# Patient Record
Sex: MALE | Race: BLACK OR AFRICAN AMERICAN | NOT HISPANIC OR LATINO | URBAN - METROPOLITAN AREA
[De-identification: names, ages, dates, MRNs, and addresses within clinical notes are randomized per-mention and may not be internally consistent; named-entity substitution may affect disease eponyms.]

---

## 2020-07-16 ENCOUNTER — EMERGENCY (EMERGENCY)
Facility: HOSPITAL | Age: 25
LOS: 1 days | Discharge: HOME | End: 2020-07-16
Attending: EMERGENCY MEDICINE | Admitting: EMERGENCY MEDICINE
Payer: COMMERCIAL

## 2020-07-16 VITALS
OXYGEN SATURATION: 100 % | SYSTOLIC BLOOD PRESSURE: 129 MMHG | HEART RATE: 60 BPM | RESPIRATION RATE: 16 BRPM | TEMPERATURE: 98 F | DIASTOLIC BLOOD PRESSURE: 68 MMHG

## 2020-07-16 PROCEDURE — 99283 EMERGENCY DEPT VISIT LOW MDM: CPT

## 2020-07-16 NOTE — ED PROVIDER NOTE - ATTENDING CONTRIBUTION TO CARE
I performed a face to face evaluation of this patient and obtained a history and performed a full exam.  I agree with the history, physical exam and plan of the PA.    Brief HPI:  24 y/o male with no significant PMHx presents to the ER requesting COVID testing.     Vitals:   Reviewed    Exam:    GEN:  Non-toxic appearing, non-distressed, speaking full sentences, non-diaphoretic, AAOx3  HEENT:  NCAT, neck supple, EOMI, PERRLA, sclera anicteric, no conjunctival pallor or injection, no stridor, normal voice, no tonsillar exudate, uvula midline, tympanic membranes and external auditory canals normal appearing bilaterally   CV:  regular rhythm and rate, s1/s2 audible, no murmurs, rubs or gallops, peripheral pulses 2+ and symmetric  PULM:  non-labored respirations, lungs clear to auscultation bilaterally, no wheezes, crackles or rales  ABD:  non distended, non-tender, no rebound, no guarding, negative Mohr's sign, bowel sounds normal, no cvat  MSK:  no gross deformity, non-tender extremities and joints, range of motion grossly normal appearing, no extremity edema, extremities warm and well perfused   NEURO:  AAOx3, CN II-XII intact, motor 5/5 in upper and lower extremities bilaterally, sensation grossly intact in extremities and trunk, finger to nose testing wnl, no nystagmus, negative Romberg, no pronator drift, no gait deficit  SKIN:  warm, dry, no rash or vesicles     A/P:  24 y/o male with no significant PMHx presents to the ER requesting COVID testing.  Asymptomatic.  Will send pcr and dc.

## 2020-07-16 NOTE — ED PROVIDER NOTE - PATIENT PORTAL LINK FT
You can access the FollowMyHealth Patient Portal offered by [facility] by registering at the following website: [email or web address] By joining [facility]’s FollowMyHealth portal, you will also be able to view your health information using other applications (apps) compatible with our system.

## 2020-07-16 NOTE — ED PROVIDER NOTE - NSFOLLOWUPINSTRUCTIONS_ED_ALL_ED_FT
Follow up with your Primary Medical Doctor.  Wear a mask.   It is very important to be diligent about hand washing.  Self isolate as discussed.   Return to the ER for any persistent/worsening or new symptoms chest pain, shortness of breath, cough, fevers, chills, or any concerning symptoms.

## 2020-07-16 NOTE — ED PROVIDER NOTE - OBJECTIVE STATEMENT
24 y/o male with no significant PMHx presents to the ER requesting COVID testing.  Pt states he was exposed to someone who tested for COVID positive 13 days ago.  Pt denies fevers, chills, cough, shortness of breath or any complaints.  Pt is traveling to [place] on Sunday and need test completed prior to travel. none 26 y/o male with no significant PMHx presents to the ER requesting COVID testing.  Pt states he was exposed to someone who tested COVID positive 13 days ago.  Pt denies fevers, chills, cough, shortness of breath or any complaints.  Pt is traveling to [place] on Sunday and needs test completed prior to travel.

## 2020-07-16 NOTE — ED ADULT NURSE NOTE - OBJECTIVE STATEMENT
Patient is a 25-year-old male received to intake rm 8, A&OX4/ambulatroy here for covid 19 testing for travel authorization. Patient Covid 19 sent. Will continue to monitor.

## 2020-07-17 LAB — SARS-COV-2 RNA SPEC QL NAA+PROBE: SIGNIFICANT CHANGE UP

## 2021-12-05 ENCOUNTER — APPOINTMENT (OUTPATIENT)
Dept: HOME HEALTH SERVICES | Facility: HOME HEALTH | Age: 26
End: 2021-12-05
